# Patient Record
Sex: MALE | Employment: STUDENT | URBAN - METROPOLITAN AREA
[De-identification: names, ages, dates, MRNs, and addresses within clinical notes are randomized per-mention and may not be internally consistent; named-entity substitution may affect disease eponyms.]

---

## 2018-10-05 ENCOUNTER — OFFICE VISIT (OUTPATIENT)
Dept: URGENT CARE | Facility: URGENT CARE | Age: 18
End: 2018-10-05
Payer: COMMERCIAL

## 2018-10-05 VITALS
HEART RATE: 69 BPM | WEIGHT: 185.6 LBS | SYSTOLIC BLOOD PRESSURE: 118 MMHG | DIASTOLIC BLOOD PRESSURE: 80 MMHG | OXYGEN SATURATION: 98 % | RESPIRATION RATE: 14 BRPM | TEMPERATURE: 98.6 F

## 2018-10-05 DIAGNOSIS — R11.0 NAUSEA: ICD-10-CM

## 2018-10-05 DIAGNOSIS — J02.9 VIRAL PHARYNGITIS: Primary | ICD-10-CM

## 2018-10-05 DIAGNOSIS — R07.0 THROAT PAIN: ICD-10-CM

## 2018-10-05 LAB
DEPRECATED S PYO AG THROAT QL EIA: NORMAL
SPECIMEN SOURCE: NORMAL

## 2018-10-05 PROCEDURE — 87081 CULTURE SCREEN ONLY: CPT | Performed by: FAMILY MEDICINE

## 2018-10-05 PROCEDURE — 99214 OFFICE O/P EST MOD 30 MIN: CPT | Performed by: FAMILY MEDICINE

## 2018-10-05 PROCEDURE — 87880 STREP A ASSAY W/OPTIC: CPT | Performed by: FAMILY MEDICINE

## 2018-10-05 RX ORDER — ONDANSETRON 4 MG/1
4 TABLET, FILM COATED ORAL EVERY 8 HOURS PRN
Qty: 18 TABLET | Refills: 0 | Status: SHIPPED | OUTPATIENT
Start: 2018-10-05 | End: 2018-11-12

## 2018-10-05 ASSESSMENT — ENCOUNTER SYMPTOMS
NAUSEA: 1
MYALGIAS: 1
DIAPHORESIS: 1
SORE THROAT: 1
COUGH: 1
FEVER: 1
RHINORRHEA: 1
DIARRHEA: 0
VOMITING: 0
SHORTNESS OF BREATH: 0
CHILLS: 1
TROUBLE SWALLOWING: 1

## 2018-10-05 NOTE — MR AVS SNAPSHOT
"              After Visit Summary   10/5/2018    Manuel Barnes    MRN: [id number]           Patient Information     Date Of Birth          2000        Visit Information        Provider Department      10/5/2018 12:05 PM Nabil Morrell MD [facility]        Today's Diagnoses     Throat pain    -  1       Follow-ups after your visit        Who to contact     If you have questions or need follow up information about today's clinic visit or your schedule please contact [facility] directly at [phone].  Normal or non-critical lab and imaging results will be communicated to you by Kongregatehart, letter or phone within 4 business days after the clinic has received the results. If you do not hear from us within 7 days, please contact the clinic through Kongregatehart or phone. If you have a critical or abnormal lab result, we will notify you by phone as soon as possible.  Submit refill requests through Virtualtwo or call your pharmacy and they will forward the refill request to us. Please allow 3 business days for your refill to be completed.          Additional Information About Your Visit        MyChart Information     Virtualtwo lets you send messages to your doctor, view your test results, renew your prescriptions, schedule appointments and more. To sign up, go to [email or web address] . Click on \"Log in\" on the left side of the screen, which will take you to the Welcome page. Then click on \"Sign up Now\" on the right side of the page.     You will be asked to enter the access code listed below, as well as some personal information. Please follow the directions to create your username and password.     Your access code is: [email or web address]  Expires: 1/3/2019 12:51 PM     Your access code will  in 90 days. If you need help or a new code, please call your [place] clinic or [phone].        Care EveryWhere ID     This is your Care EveryWhere ID. This could be used by " other organizations to access your [place] medical records  [id number]        Your Vitals Were     Pulse Temperature Respirations Pulse Oximetry          69 98.6  F (37  C) (Oral) 14 98%         Blood Pressure from Last 3 Encounters:   10/05/18 118/80   02/15/13 115/68    Weight from Last 3 Encounters:   10/05/18 185 lb 9.6 oz (84.2 kg) (85 %)*   02/15/13 128 lb 4.9 oz (58.2 kg) (71 %)*     * Growth percentiles are based on [facility] 2-20 Years data.              We Performed the Following     Beta strep group A culture     Strep, Rapid Screen          Today's Medication Changes          These changes are accurate as of 10/5/18 12:51 PM.  If you have any questions, ask your nurse or doctor.               Start taking these medicines.        Dose/Directions    nabumetone 750 MG tablet   Commonly known as:  RELAFEN   Used for:  Throat pain   Started by:  Nabil Morrell MD        Dose:  750 mg   Take 1 tablet (750 mg) by mouth 2 times daily as needed for moderate pain   Quantity:  30 tablet   Refills:  1       ondansetron 4 MG tablet   Commonly known as:  ZOFRAN   Used for:  Throat pain   Started by:  Nabil Morrell MD        Dose:  4 mg   Take 1 tablet (4 mg) by mouth every 8 hours as needed for nausea   Quantity:  18 tablet   Refills:  0            Where to get your medicines      These medications were sent to [facility] [address] [place] [address] [address] [facility] [address], [facility] [address]     Phone:  [phone]     nabumetone 750 MG tablet    ondansetron 4 MG tablet                Primary Care Provider Fax #    Physician No Ref-Primary [phone]       No address on file        Equal Access to Services     WIL CESPEDES AH: Shala Ma, waaxda luqadaha, qaybta kaalmada adeegyada, nanette falcon. So [facility] [phone].    ATENCIÓN: Si habla español, tiene a mora disposición servicios gratuitos de asistencia lingüística.  Tyree brar [phone].    We comply with applicable federal civil rights laws and [place] laws. We do not discriminate on the basis of race, color, national origin, age, disability, sex, sexual orientation, or gender identity.            Thank you!     Thank you for choosing [facility]  for your care. Our goal is always to provide you with excellent care. Hearing back from our patients is one way we can continue to improve our services. Please take a few minutes to complete the written survey that you may receive in the mail after your visit with us. Thank you!             Your Updated Medication List - Protect others around you: Learn how to safely use, store and throw away your medicines at www.disposemymeds.org.          This list is accurate as of 10/5/18 12:51 PM.  Always use your most recent med list.                   Brand Name Dispense Instructions for use Diagnosis    nabumetone 750 MG tablet    RELAFEN    30 tablet    Take 1 tablet (750 mg) by mouth 2 times daily as needed for moderate pain    Throat pain       NO ACTIVE MEDICATIONS           ondansetron 4 MG tablet    ZOFRAN    18 tablet    Take 1 tablet (4 mg) by mouth every 8 hours as needed for nausea    Throat pain       TYLENOL PO

## 2018-10-05 NOTE — PROGRESS NOTES
SUBJECTIVE:   Manuel Barnes is a 17 year old male presenting with a chief complaint of   Chief Complaint   Patient presents with     Throat Pain     pt states throat pain,body aches, fever x 5 days        He is an established patient of [facility].    The patient reports onset of subjective fever, chills, diaphoresis, and diffuse myalgias beginning about four days ago. Then, about 2-3 days ago, he developed a sore throat, along with mild congestion, rhinorrhea, dry cough, and mild bilateral ear pain. He also notes some nausea and difficulty swallowing secondary to pain. He denies any strep exposure, but notes that he is in college and around sick people all of the time. He has been taking ibuprofen for his symptoms without much relief. He is concerned for strep.    Review of Systems   Constitutional: Positive for chills, diaphoresis and fever.   HENT: Positive for congestion, ear pain, rhinorrhea, sore throat and trouble swallowing.    Respiratory: Positive for cough. Negative for shortness of breath.    Gastrointestinal: Positive for nausea. Negative for diarrhea and vomiting.   Musculoskeletal: Positive for myalgias.       Past Medical History:   Diagnosis Date     Food allergy     fish     H/O forearm fracture      Family History   Problem Relation Age of Onset     Other - See Comments Mother      churg-niurka     Other - See Comments Mother      calcium abnormalities, related to churg-niurka or treatment, unclear     Myocardial Infarction Maternal Uncle      Current Outpatient Prescriptions   Medication Sig Dispense Refill     Acetaminophen (TYLENOL PO)        nabumetone (RELAFEN) 750 MG tablet Take 1 tablet (750 mg) by mouth 2 times daily as needed for moderate pain 30 tablet 1     ondansetron (ZOFRAN) 4 MG tablet Take 1 tablet (4 mg) by mouth every 8 hours as needed for nausea 18 tablet 0     NO ACTIVE MEDICATIONS        Social History   Substance Use Topics     Smoking status: Never Smoker     Smokeless  tobacco: Never Used     Alcohol use Not on file       OBJECTIVE  /80  Pulse 69  Temp 98.6  F (37  C) (Oral)  Resp 14  Wt 185 lb 9.6 oz (84.2 kg)  SpO2 98%    Physical Exam   Constitutional: He appears well-developed and well-nourished.   HENT:   Head: Normocephalic.   Right Ear: Tympanic membrane, external ear and ear canal normal.   Left Ear: Tympanic membrane, external ear and ear canal normal.   Nose: Nose normal.   Mouth/Throat: Uvula is midline and mucous membranes are normal. No uvula swelling. Posterior oropharyngeal erythema present. No oropharyngeal exudate or posterior oropharyngeal edema. Tonsils are 0 on the right. Tonsils are 0 on the left. No tonsillar exudate.   Eyes: Conjunctivae are normal.   Neck: Normal range of motion. Neck supple.   Cardiovascular: Normal rate, regular rhythm, S1 normal and S2 normal.  Exam reveals no gallop and no friction rub.    No murmur heard.  Pulmonary/Chest: Effort normal and breath sounds normal. He has no wheezes. He has no rhonchi. He has no rales.   Lymphadenopathy:     He has cervical adenopathy.        Right cervical: Superficial cervical adenopathy present.        Left cervical: Superficial cervical adenopathy present.   Neurological: He is alert.   Skin: Skin is warm and dry. No rash noted.       Labs:  Results for orders placed or performed in visit on 10/05/18 (from the past 24 hour(s))   Strep, Rapid Screen   Result Value Ref Range    Specimen Description Throat     Rapid Strep A Screen       NEGATIVE: No Group A streptococcal antigen detected by immunoassay, await culture report.   Culture pending    ASSESSMENT:      ICD-10-CM    1. Viral pharyngitis J02.9    2. Throat pain R07.0 Strep, Rapid Screen     Beta strep group A culture     ondansetron (ZOFRAN) 4 MG tablet     nabumetone (RELAFEN) 750 MG tablet   3. Nausea R11.0     4. Myalgia    Suspect that this is more viral at this time. However did educate on secondary infection.  Will RTC in 48-72  hours if not improved.       PLAN:  1) Discussed likely viral nature of symptoms at this point in time in setting of negative strep. Culture is still pending.  2) Advised symptomatic management, including rest, pushing fluids, throat lozenges.  3) Provided Rx for Relafen for pain management and inflammation, as well as an Rx for Zofran for nausea.  4) Advised follow up with ongoing symptoms or sooner with worsening.    INÉS Starks  10/5/2018 at 1:03 PM    Patient seen and examined.  Note was reviewed and I agree with the assessment    Nabil Morrell MD

## 2018-10-06 LAB
BACTERIA SPEC CULT: NORMAL
SPECIMEN SOURCE: NORMAL

## 2018-11-12 ENCOUNTER — OFFICE VISIT (OUTPATIENT)
Dept: FAMILY MEDICINE | Facility: CLINIC | Age: 18
End: 2018-11-12
Payer: COMMERCIAL

## 2018-11-12 VITALS
TEMPERATURE: 99.6 F | HEART RATE: 82 BPM | DIASTOLIC BLOOD PRESSURE: 70 MMHG | SYSTOLIC BLOOD PRESSURE: 130 MMHG | RESPIRATION RATE: 16 BRPM | WEIGHT: 183 LBS | BODY MASS INDEX: 22.75 KG/M2 | OXYGEN SATURATION: 98 % | HEIGHT: 75 IN

## 2018-11-12 DIAGNOSIS — J02.0 STREP THROAT: ICD-10-CM

## 2018-11-12 DIAGNOSIS — R07.0 THROAT PAIN: Primary | ICD-10-CM

## 2018-11-12 LAB
DEPRECATED S PYO AG THROAT QL EIA: ABNORMAL
SPECIMEN SOURCE: ABNORMAL

## 2018-11-12 PROCEDURE — 87880 STREP A ASSAY W/OPTIC: CPT | Performed by: NURSE PRACTITIONER

## 2018-11-12 PROCEDURE — 99213 OFFICE O/P EST LOW 20 MIN: CPT | Performed by: NURSE PRACTITIONER

## 2018-11-12 NOTE — MR AVS SNAPSHOT
After Visit Summary   11/12/2018    Manuel Barnes    MRN: [id number]           Patient Information     Date Of Birth          2000        Visit Information        Provider Department      11/12/2018 3:40 PM Meme Del Rosario APRN [facility]        Today's Diagnoses     Throat pain    -  1    Strep throat          Care Instructions                Pharyngitis Strep (Strep Throat) Adult   Information About Your Condition:  Description  Sore throat is a common symptom that ranges in severity from just a sense of scratchiness to severe pain. Pharyngitis is the medical term for sore throat.   Strep throat is an inflamed (red and swollen) throat caused by infection with a kind of bacteria called group A Streptococci. With treatment, the fever and sore throat pain are usually gone within 24 hours. After taking antibiotics for 24 hours you are no longer contagious. It is important to treat strep throat to prevent some rare but serious complications such as rheumatic fever (a disease that affects the heart) or glomerulonephritis (a disease that affects the kidneys).   Symptoms   The symptoms of a strep infection may include one or more of the following:   sore, red throat   painful swallowing   fever   chills   headaches   muscle aches and pains   tired feeling   swollen, tender lymph nodes (glands) in the neck   loss of appetite   Causes  Strep throat is caused by infection with bacteria called Group A Streptococci. Strep infections are very contagious. They are usually passed directly from person to person. Strep throat is most common in school-age children, who then often pass it to the rest of the family. It most often occurs from November through April, but it can happen any time of year.   What You Should Do At Home (Follow-up Care)   It is important to get plenty of rest when you are not feeling well so that your body may focus its energy on getting you healthy.   If you  smoke, stop. If someone else in your household smokes ask them to smoke outside.   If you were given a prescription for antibiotics, be sure to get it filled right away. Follow the directions exactly. Take the medicine until it is completely gone. Do not stop taking it just because you feel better.   Acetaminophen (Tylenol ) or over-the-counter anti-inflammatory medicine such as ibuprofen (Motrin , Advil ) or naproxen (Aleve , Naprosyn ) may help decrease fever and pain. You should not take ibuprofen or naproxen if you have a history of bleeding in your stomach.   As long as your healthcare provider has not told you differently, drink plenty of liquids. An average adult should drink at least 6 to 10 eight-ounce glasses of liquids that do not contain alcohol (including beer or wine), such as water, juice, or weak tea each day. One way to tell if you are drinking enough liquid is to look at the color of your urine (pee). It should be very light yellow.   If eating hurts your throat, don't force yourself to eat solid food. When you are able to eat more foods, choose healthy food to give you strength and to help fight the infection.   If the air in your home is dry, a cool-mist humidifier can moisten the air and help make breathing easier. Be sure to clean your humidifier often so that bacteria and mold can t grow. You can also try running hot water in the shower or bathtub to steam up the bathroom. Sit in there for 10 to 15 minutes if you are coughing hard or having trouble breathing.   Gargle with salt water. Stir 1/2 teaspoon salt in an 8-ounce glass of warm water to make your own salt water gargle.   Suck on lozenges or hard candy.   Don't talk a lot. Rest your voice.   Avoid close contact with other people until you have been taking the antibiotic for 24 to 48 hours so they will not be exposed to the strep bacteria.   Cover your nose and mouth with a tissue when coughing or sneezing and then throw it away in the  nearest waste receptacle.   Wash your hands often with warm water and soap for at least 15 seconds before you touch food, dishes, glasses, silverware, or cloth napkins. You can also carry an alcohol-based hand  with you to clean your hands when soap and water aren t available.   Wash your hands after you cough.   Please keep all medicines out of the reach of children.   What You Can Do To Stay Healthy  Be careful not to let your nose or mouth touch public telephones or drinking fountains.   Use paper cups and paper towels in bathrooms instead of shared drinking cups and hand towels.   Do not share food and eating utensils with others.   Stay away from people known to be sick.   Care Alerts  Call 911 if:  You have trouble breathing or swallowing.   Your feel like your throat or tongue are swelling.   Call Your Healthcare Provider Right Away Or Return To The Emergency Department If:  You are coughing up yellow or greenish phlegm (mucus.)   You have a severe headache that does not get better with acetaminophen or ibuprofen.   You start to have a very stiff neck and have pain when you bend your head forward.   You have a fever higher than 101.5  F (38.6  C) orally that does not go down after taking acetaminophen or ibuprofen.   You have any symptoms that worry you            Follow-ups after your visit        Follow-up notes from your care team     Return in about 7 days (around 11/19/2018), or if symptoms worsen or fail to improve.      Who to contact     If you have questions or need follow up information about today's clinic visit or your schedule please contact [facility] directly at [phone].  Normal or non-critical lab and imaging results will be communicated to you by MyChart, letter or phone within 4 business days after the clinic has received the results. If you do not hear from us within 7 days, please contact the clinic through MyChart or phone. If you have a critical or  "abnormal lab result, we will notify you by phone as soon as possible.  Submit refill requests through docTrackr or call your pharmacy and they will forward the refill request to us. Please allow 3 business days for your refill to be completed.          Additional Information About Your Visit        Care EveryWhere ID     This is your Care EveryWhere ID. This could be used by other organizations to access your [place] medical records  [id number]        Your Vitals Were     Pulse Temperature Respirations Height Pulse Oximetry BMI (Body Mass Index)    82 99.6  F (37.6  C) (Tympanic) 16 6' 3\" (1.905 m) 98% 22.87 kg/m2       Blood Pressure from Last 3 Encounters:   11/12/18 130/70   10/05/18 118/80   02/15/13 115/68    Weight from Last 3 Encounters:   11/12/18 183 lb (83 kg) (83 %)*   10/05/18 185 lb 9.6 oz (84.2 kg) (85 %)*   02/15/13 128 lb 4.9 oz (58.2 kg) (71 %)*     * Growth percentiles are based on [facility] 2-20 Years data.              We Performed the Following     Strep, Rapid Screen          Today's Medication Changes          These changes are accurate as of 11/12/18  4:46 PM.  If you have any questions, ask your nurse or doctor.               Start taking these medicines.        Dose/Directions    penicillin G benzathine 281821 UNIT/ML injection   Commonly known as:  BICILLIN   Used for:  Strep throat   Started by:  Meme Del Rosario APRN CNP        Dose:  5260304 Units   Inject 2 mLs (1,200,000 Units) into the muscle once for 1 dose   Quantity:  2 mL   Refills:  0            Where to get your medicines      Some of these will need a paper prescription and others can be bought over the counter.  Ask your nurse if you have questions.     You don't need a prescription for these medications     penicillin G benzathine [id number] UNIT/ML injection                Primary Care Provider Fax #    Physician No Ref-Primary [phone]       No address on file        Equal Access to Services     WIL CESPEDES AH: Hadii jade león " eyad Ma, shabana trejooleha, donnata kadaphne marniesherry, nanette bismarkin hayaayosef dyeromero adriannaandrés lajuan luisyosef ezekiel. So [facility] [phone].    ATENCIÓN: Si habla español, tiene a mora disposición servicios gratuitos de asistencia lingüística. Tyree al [phone].    We comply with applicable federal civil rights laws and [place] laws. We do not discriminate on the basis of race, color, national origin, age, disability, sex, sexual orientation, or gender identity.            Thank you!     Thank you for choosing [facility]  for your care. Our goal is always to provide you with excellent care. Hearing back from our patients is one way we can continue to improve our services. Please take a few minutes to complete the written survey that you may receive in the mail after your visit with us. Thank you!             Your Updated Medication List - Protect others around you: Learn how to safely use, store and throw away your medicines at www.disposemymeds.org.          This list is accurate as of 11/12/18  4:46 PM.  Always use your most recent med list.                   Brand Name Dispense Instructions for use Diagnosis    penicillin G benzathine [id number] UNIT/ML injection    BICILLIN    2 mL    Inject 2 mLs (1,200,000 Units) into the muscle once for 1 dose    Strep throat

## 2018-11-12 NOTE — NURSING NOTE
he following medication was given:     MEDICATION: Bicillin LA 1.2  ROUTE: IM  SITE: Deltoid - Left  DOSE: 1,200,000  LOT #: [id number]  :  [employer]  EXPIRATION DATE:  02/20  NDC#: 74321-471-58  Marce Pascal MA

## 2018-11-12 NOTE — PROGRESS NOTES
"  SUBJECTIVE:   Manuel Barnes is a 17 year old male who presents to clinic today for the following health issues:    Throat Pain     Onset: past 48 hours    Fever: YES    Chills/Sweats: no     Headache (location?): YES    Sinus Pressure:no    Conjunctivitis:  no    Ear Pain: YES: left    Rhinorrhea: no     Congestion: no     Sore Throat: YES     Cough: YES    Wheeze: no     Decreased Appetite: YES    Nausea: no     Vomiting: no     Diarrhea:  no     Dysuria/Freq.: no     Fatigue/Achiness: YES    Sick/Strep Exposure: no      Therapies Tried and outcome: none    Problem list and histories reviewed & adjusted, as indicated.  Additional history: as documented    Reviewed and updated as needed this visit by clinical staff  Tobacco  Allergies  Meds  Problems  Med Hx  Surg Hx  Fam Hx  Soc Hx        Reviewed and updated as needed this visit by Provider  Tobacco  Allergies  Meds  Problems  Med Hx  Surg Hx  Fam Hx  Soc Hx          ROS:  Constitutional, HEENT, cardiovascular, pulmonary, gi and gu systems are negative, except as otherwise noted.    OBJECTIVE:     /70 (BP Location: Right arm, Patient Position: Sitting, Cuff Size: Adult Regular)  Pulse 82  Temp 99.6  F (37.6  C) (Tympanic)  Resp 16  Ht 6' 3\" (1.905 m)  Wt 183 lb (83 kg)  SpO2 98%  BMI 22.87 kg/m2  Body mass index is 22.87 kg/(m^2).  GENERAL: healthy, alert and no distress  EYES: Eyes grossly normal to inspection, PERRL and conjunctivae and sclerae normal  HENT: ear canals and TM's normal, nose  without ulcers or lesions.  Posterior pharynx erythematous and swollen tonsils  NECK: no adenopathy, no asymmetry, masses, or scars and thyroid normal to palpation  RESP: lungs clear to auscultation - no rales, rhonchi or wheezes  CV: regular rate and rhythm, normal S1 S2, no S3 or S4, no murmur, click or rub, no peripheral edema and peripheral pulses strong  MS: no gross musculoskeletal defects noted, no edema  SKIN: no suspicious lesions or " frances    Diagnostic Test Results:  Results for orders placed or performed in visit on 11/12/18 (from the past 24 hour(s))   Strep, Rapid Screen   Result Value Ref Range    Specimen Description Throat     Rapid Strep A Screen (A)      POSITIVE: Group A Streptococcal antigen detected by immunoassay.       ASSESSMENT/PLAN:       ICD-10-CM    1. Throat pain R07.0 Strep, Rapid Screen   2. Strep throat J02.0 penicillin G benzathine (BICILLIN) [id number] UNIT/ML injection     push fluids, rest as able.  Elevate HOB, lots of handwashing.  Toss your toothbrush after 24 hours on the antibiotics.  F/u if persists or worsens  See Patient Instructions    LAURO Main CNP  [facility]

## 2018-11-12 NOTE — PATIENT INSTRUCTIONS
Pharyngitis Strep (Strep Throat) Adult   Information About Your Condition:  Description  Sore throat is a common symptom that ranges in severity from just a sense of scratchiness to severe pain. Pharyngitis is the medical term for sore throat.   Strep throat is an inflamed (red and swollen) throat caused by infection with a kind of bacteria called group A Streptococci. With treatment, the fever and sore throat pain are usually gone within 24 hours. After taking antibiotics for 24 hours you are no longer contagious. It is important to treat strep throat to prevent some rare but serious complications such as rheumatic fever (a disease that affects the heart) or glomerulonephritis (a disease that affects the kidneys).   Symptoms   The symptoms of a strep infection may include one or more of the following:   sore, red throat   painful swallowing   fever   chills   headaches   muscle aches and pains   tired feeling   swollen, tender lymph nodes (glands) in the neck   loss of appetite   Causes  Strep throat is caused by infection with bacteria called Group A Streptococci. Strep infections are very contagious. They are usually passed directly from person to person. Strep throat is most common in school-age children, who then often pass it to the rest of the family. It most often occurs from November through April, but it can happen any time of year.   What You Should Do At Home (Follow-up Care)   It is important to get plenty of rest when you are not feeling well so that your body may focus its energy on getting you healthy.   If you smoke, stop. If someone else in your household smokes ask them to smoke outside.   If you were given a prescription for antibiotics, be sure to get it filled right away. Follow the directions exactly. Take the medicine until it is completely gone. Do not stop taking it just because you feel better.   Acetaminophen (Tylenol ) or over-the-counter anti-inflammatory medicine such as  ibuprofen (Motrin , Advil ) or naproxen (Aleve , Naprosyn ) may help decrease fever and pain. You should not take ibuprofen or naproxen if you have a history of bleeding in your stomach.   As long as your healthcare provider has not told you differently, drink plenty of liquids. An average adult should drink at least 6 to 10 eight-ounce glasses of liquids that do not contain alcohol (including beer or wine), such as water, juice, or weak tea each day. One way to tell if you are drinking enough liquid is to look at the color of your urine (pee). It should be very light yellow.   If eating hurts your throat, don't force yourself to eat solid food. When you are able to eat more foods, choose healthy food to give you strength and to help fight the infection.   If the air in your home is dry, a cool-mist humidifier can moisten the air and help make breathing easier. Be sure to clean your humidifier often so that bacteria and mold can t grow. You can also try running hot water in the shower or bathtub to steam up the bathroom. Sit in there for 10 to 15 minutes if you are coughing hard or having trouble breathing.   Gargle with salt water. Stir 1/2 teaspoon salt in an 8-ounce glass of warm water to make your own salt water gargle.   Suck on lozenges or hard candy.   Don't talk a lot. Rest your voice.   Avoid close contact with other people until you have been taking the antibiotic for 24 to 48 hours so they will not be exposed to the strep bacteria.   Cover your nose and mouth with a tissue when coughing or sneezing and then throw it away in the nearest waste receptacle.   Wash your hands often with warm water and soap for at least 15 seconds before you touch food, dishes, glasses, silverware, or cloth napkins. You can also carry an alcohol-based hand  with you to clean your hands when soap and water aren t available.   Wash your hands after you cough.   Please keep all medicines out of the reach of children.   What  You Can Do To Stay Healthy  Be careful not to let your nose or mouth touch public telephones or drinking fountains.   Use paper cups and paper towels in bathrooms instead of shared drinking cups and hand towels.   Do not share food and eating utensils with others.   Stay away from people known to be sick.   Care Alerts  Call 911 if:  You have trouble breathing or swallowing.   Your feel like your throat or tongue are swelling.   Call Your Healthcare Provider Right Away Or Return To The Emergency Department If:  You are coughing up yellow or greenish phlegm (mucus.)   You have a severe headache that does not get better with acetaminophen or ibuprofen.   You start to have a very stiff neck and have pain when you bend your head forward.   You have a fever higher than 101.5  F (38.6  C) orally that does not go down after taking acetaminophen or ibuprofen.   You have any symptoms that worry you

## 2021-03-28 ENCOUNTER — HOSPITAL ENCOUNTER (EMERGENCY)
Facility: CLINIC | Age: 21
Discharge: HOME | End: 2021-03-28
Attending: NURSE PRACTITIONER | Admitting: NURSE PRACTITIONER

## 2021-03-28 VITALS
TEMPERATURE: 98 F | HEART RATE: 72 BPM | OXYGEN SATURATION: 99 % | SYSTOLIC BLOOD PRESSURE: 135 MMHG | DIASTOLIC BLOOD PRESSURE: 74 MMHG | RESPIRATION RATE: 14 BRPM

## 2021-03-28 DIAGNOSIS — T40.8X1A: ICD-10-CM

## 2021-03-28 LAB
AMPHETAMINES UR QL: NOT DETECTED NG/ML
ANION GAP SERPL CALCULATED.3IONS-SCNC: 7 MMOL/L (ref 3–14)
BARBITURATES UR QL SCN: NOT DETECTED NG/ML
BASOPHILS # BLD AUTO: 0 10E9/L (ref 0–0.2)
BASOPHILS NFR BLD AUTO: 0.2 %
BENZODIAZ UR QL SCN: NOT DETECTED NG/ML
BUN SERPL-MCNC: 13 MG/DL (ref 7–30)
BUPRENORPHINE UR QL: NOT DETECTED NG/ML
CALCIUM SERPL-MCNC: 9.5 MG/DL (ref 8.5–10.1)
CANNABINOIDS UR QL: NOT DETECTED NG/ML
CHLORIDE SERPL-SCNC: 108 MMOL/L (ref 94–109)
CO2 SERPL-SCNC: 26 MMOL/L (ref 20–32)
COCAINE UR QL SCN: NOT DETECTED NG/ML
CREAT SERPL-MCNC: 0.89 MG/DL (ref 0.66–1.25)
D-METHAMPHET UR QL: NOT DETECTED NG/ML
DIFFERENTIAL METHOD BLD: ABNORMAL
EOSINOPHIL # BLD AUTO: 0.9 10E9/L (ref 0–0.7)
EOSINOPHIL NFR BLD AUTO: 7.7 %
ERYTHROCYTE [DISTWIDTH] IN BLOOD BY AUTOMATED COUNT: 12.1 % (ref 10–15)
GFR SERPL CREATININE-BSD FRML MDRD: >90 ML/MIN/{1.73_M2}
GLUCOSE SERPL-MCNC: 105 MG/DL (ref 70–99)
HCT VFR BLD AUTO: 43.9 % (ref 40–53)
HGB BLD-MCNC: 15.1 G/DL (ref 13.3–17.7)
IMM GRANULOCYTES # BLD: 0 10E9/L (ref 0–0.4)
IMM GRANULOCYTES NFR BLD: 0.3 %
LYMPHOCYTES # BLD AUTO: 1.6 10E9/L (ref 0.8–5.3)
LYMPHOCYTES NFR BLD AUTO: 13.4 %
MCH RBC QN AUTO: 32 PG (ref 26.5–33)
MCHC RBC AUTO-ENTMCNC: 34.4 G/DL (ref 31.5–36.5)
MCV RBC AUTO: 93 FL (ref 78–100)
METHADONE UR QL SCN: NOT DETECTED NG/ML
MONOCYTES # BLD AUTO: 0.7 10E9/L (ref 0–1.3)
MONOCYTES NFR BLD AUTO: 5.7 %
NEUTROPHILS # BLD AUTO: 8.8 10E9/L (ref 1.6–8.3)
NEUTROPHILS NFR BLD AUTO: 72.7 %
NRBC # BLD AUTO: 0 10*3/UL
NRBC BLD AUTO-RTO: 0 /100
OPIATES UR QL SCN: NOT DETECTED NG/ML
OXYCODONE UR QL SCN: NOT DETECTED NG/ML
PCP UR QL SCN: NOT DETECTED NG/ML
PLATELET # BLD AUTO: 228 10E9/L (ref 150–450)
POTASSIUM SERPL-SCNC: 3.7 MMOL/L (ref 3.4–5.3)
PROPOXYPH UR QL: NOT DETECTED NG/ML
RBC # BLD AUTO: 4.72 10E12/L (ref 4.4–5.9)
SODIUM SERPL-SCNC: 141 MMOL/L (ref 133–144)
TRICYCLICS UR QL SCN: NOT DETECTED NG/ML
WBC # BLD AUTO: 12.1 10E9/L (ref 4–11)

## 2021-03-28 PROCEDURE — 85025 COMPLETE CBC W/AUTO DIFF WBC: CPT | Performed by: NURSE PRACTITIONER

## 2021-03-28 PROCEDURE — 96374 THER/PROPH/DIAG INJ IV PUSH: CPT | Performed by: NURSE PRACTITIONER

## 2021-03-28 PROCEDURE — 99285 EMERGENCY DEPT VISIT HI MDM: CPT | Mod: 25 | Performed by: NURSE PRACTITIONER

## 2021-03-28 PROCEDURE — 250N000011 HC RX IP 250 OP 636: Performed by: NURSE PRACTITIONER

## 2021-03-28 PROCEDURE — 250N000013 HC RX MED GY IP 250 OP 250 PS 637: Performed by: NURSE PRACTITIONER

## 2021-03-28 PROCEDURE — 93005 ELECTROCARDIOGRAM TRACING: CPT | Performed by: NURSE PRACTITIONER

## 2021-03-28 PROCEDURE — 80048 BASIC METABOLIC PNL TOTAL CA: CPT | Performed by: NURSE PRACTITIONER

## 2021-03-28 PROCEDURE — 99284 EMERGENCY DEPT VISIT MOD MDM: CPT | Mod: 25 | Performed by: NURSE PRACTITIONER

## 2021-03-28 PROCEDURE — 93010 ELECTROCARDIOGRAM REPORT: CPT | Performed by: NURSE PRACTITIONER

## 2021-03-28 PROCEDURE — 80306 DRUG TEST PRSMV INSTRMNT: CPT | Performed by: NURSE PRACTITIONER

## 2021-03-28 RX ORDER — LORAZEPAM 2 MG/ML
1 INJECTION INTRAMUSCULAR ONCE
Status: COMPLETED | OUTPATIENT
Start: 2021-03-28 | End: 2021-03-28

## 2021-03-28 RX ORDER — LORAZEPAM 0.5 MG/1
0.5 TABLET ORAL
Status: COMPLETED | OUTPATIENT
Start: 2021-03-28 | End: 2021-03-28

## 2021-03-28 RX ADMIN — LORAZEPAM 0.5 MG: 0.5 TABLET ORAL at 16:42

## 2021-03-28 RX ADMIN — LORAZEPAM 1 MG: 2 INJECTION INTRAMUSCULAR; INTRAVENOUS at 15:11

## 2021-03-28 ASSESSMENT — ENCOUNTER SYMPTOMS
GASTROINTESTINAL NEGATIVE: 1
NERVOUS/ANXIOUS: 1
CHILLS: 0
FEVER: 0
CARDIOVASCULAR NEGATIVE: 1
MUSCULOSKELETAL NEGATIVE: 1
RESPIRATORY NEGATIVE: 1

## 2021-03-28 NOTE — ED NOTES
Per Laurent at Poison Control: Recommend supportive care up to 12 hours, EKG, Tele, Benzodiazepines. Expect Hallucinations, Psychosis, Tachycardia, and Hypertension.

## 2021-03-28 NOTE — ED PROVIDER NOTES
"Updated I updated updated he was updated  History     Chief Complaint   Patient presents with     Drug Overdose     HPI  Manuel Barnes is a 20 year old male who presents via EMS for evaluation of LSD overdose. Patient uses LSD on a regular bases. Today he and his girflriend took 500 mcg each at noon, which is a higher amount than he typically takes. Feels like his \"limbs are hypoxic\". Patient called ambulance and both he and his girlfriend presented here. Patient denies any other illicit drug or alcohol use. He is concerned that there is something else that was mixed in with the LSD. He purchased the drugs from \"the same jo\" he always gets it from and believes this person is trustworthy.    Allergies:  Allergies   Allergen Reactions     Cortisone Rash       Problem List:    There are no active problems to display for this patient.       Past Medical History:    Past Medical History:   Diagnosis Date     Food allergy      H/O forearm fracture        Past Surgical History:    Past Surgical History:   Procedure Laterality Date     HERNIA REPAIR         Family History:    Family History   Problem Relation Age of Onset     Other - See Comments Mother         churg-niurka/calcium abnormalities, related to churg-niurka or treatment, unclear     Myocardial Infarction Maternal Uncle        Social History:  Marital Status:  Single [1]  Social History     Tobacco Use     Smoking status: Never Smoker     Smokeless tobacco: Never Used   Substance Use Topics     Alcohol use: No     Drug use: No        Medications:    No current outpatient medications on file.        Review of Systems   Constitutional: Negative for chills and fever.   HENT: Negative.    Respiratory: Negative.    Cardiovascular: Negative.    Gastrointestinal: Negative.    Genitourinary: Negative.    Musculoskeletal: Negative.    Skin: Negative.    Neurological:        Tingling in arms/hands   Psychiatric/Behavioral: The patient is nervous/anxious.    All other " systems reviewed and are negative.      Physical Exam   BP: 132/77  Pulse: 80  Temp: 98  F (36.7  C)  Resp: 16  SpO2: 99 %      Physical Exam    GENERAL APPEARANCE: alert and oriented. NAD.   EYES: conjunctiva clear  RESP: lungs clear to auscultation - no rales, rhonchi or wheezes  CV: regular rates and rhythm, normal S1 S2, no murmur noted  NEURO: Normal strength and tone, sensory exam grossly normal,  normal speech and mentation  SKIN: no suspicious lesions or rashes    ED Course        Procedures            EKG Interpretation:      Interpreted by LAURO Abbott CNP  Time reviewed:1508   Symptoms at time of EKG: anxious   Rhythm: Normal sinus   Rate: Normal  Axis: Normal  Ectopy: None  Conduction: Right bundle branch block (incomplete)  ST Segments/ T Waves: No ST-T wave changes and No acute ischemic changes  Q Waves: None  Comparison to prior: No old EKG available    Clinical Impression: NSR with right BBB      Results for orders placed or performed during the hospital encounter of 03/28/21 (from the past 24 hour(s))   CBC with platelets differential   Result Value Ref Range    WBC 12.1 (H) 4.0 - 11.0 10e9/L    RBC Count 4.72 4.4 - 5.9 10e12/L    Hemoglobin 15.1 13.3 - 17.7 g/dL    Hematocrit 43.9 40.0 - 53.0 %    MCV 93 78 - 100 fl    MCH 32.0 26.5 - 33.0 pg    MCHC 34.4 31.5 - 36.5 g/dL    RDW 12.1 10.0 - 15.0 %    Platelet Count 228 150 - 450 10e9/L    Diff Method Automated Method     % Neutrophils 72.7 %    % Lymphocytes 13.4 %    % Monocytes 5.7 %    % Eosinophils 7.7 %    % Basophils 0.2 %    % Immature Granulocytes 0.3 %    Nucleated RBCs 0 0 /100    Absolute Neutrophil 8.8 (H) 1.6 - 8.3 10e9/L    Absolute Lymphocytes 1.6 0.8 - 5.3 10e9/L    Absolute Monocytes 0.7 0.0 - 1.3 10e9/L    Absolute Eosinophils 0.9 (H) 0.0 - 0.7 10e9/L    Absolute Basophils 0.0 0.0 - 0.2 10e9/L    Abs Immature Granulocytes 0.0 0 - 0.4 10e9/L    Absolute Nucleated RBC 0.0    Basic metabolic panel   Result Value Ref  Range    Sodium 141 133 - 144 mmol/L    Potassium 3.7 3.4 - 5.3 mmol/L    Chloride 108 94 - 109 mmol/L    Carbon Dioxide 26 20 - 32 mmol/L    Anion Gap 7 3 - 14 mmol/L    Glucose 105 (H) 70 - 99 mg/dL    Urea Nitrogen 13 7 - 30 mg/dL    Creatinine 0.89 0.66 - 1.25 mg/dL    GFR Estimate >90 >60 mL/min/[1.73_m2]    GFR Estimate If Black >90 >60 mL/min/[1.73_m2]    Calcium 9.5 8.5 - 10.1 mg/dL   Urine Drugs of Abuse Screen Panel 13   Result Value Ref Range    Cannabinoids (89-yoi-5-carboxy-9-THC) Not Detected NDET^Not Detected ng/mL    Phencyclidine (Phencyclidine) Not Detected NDET^Not Detected ng/mL    Cocaine (Benzoylecgonine) Not Detected NDET^Not Detected ng/mL    Methamphetamine (d-Methamphetamine) Not Detected NDET^Not Detected ng/mL    Opiates (Morphine) Not Detected NDET^Not Detected ng/mL    Amphetamine (d-Amphetamine) Not Detected NDET^Not Detected ng/mL    Benzodiazepines (Nordiazepam) Not Detected NDET^Not Detected ng/mL    Tricyclic Antidepressants (Desipramine) Not Detected NDET^Not Detected ng/mL    Methadone (Methadone) Not Detected NDET^Not Detected ng/mL    Barbiturates (Butalbital) Not Detected NDET^Not Detected ng/mL    Oxycodone (Oxycodone) Not Detected NDET^Not Detected ng/mL    Propoxyphene (Norpropoxyphene) Not Detected NDET^Not Detected ng/mL    Buprenorphine (Buprenorphine) Not Detected NDET^Not Detected ng/mL       Medications   LORazepam (ATIVAN) injection 1 mg (1 mg Intravenous Given 3/28/21 1511)   LORazepam (ATIVAN) tablet 0.5 mg (0.5 mg Oral Given 3/28/21 1642)     1500: RN spoke with poison control-- recommends supportive care, may take up to 6-12 hours to clear, treat negative symptoms/agitation with Benzodiazepines.   Assessments & Plan (with Medical Decision Making)   20 year old male who presents via EMS after he took 500 mcg of LSD at noon today and became anxious and feeling like his arms/hands were tingling.  Patient presented here by ambulance along with his girlfriend who also  came by ambulance with similar negative symptoms after using LSD.  Poison control was consulted and recommended symptomatic/supportive care and benzodiazepines for negative symptoms.  May take up to 6 to 12 hours to completely clear.  Patient was given IV Ativan 1 mg and again was given 0.5 mg tablet.  Has remained calm and does not appear to be anxious or in distress. Exam is unremarkable. He has been cooperative and lucid.  Labs and ekg are unremarkable.  His urine tox screen is negative for any other substances. He was monitored here for 3 hours, and it has been 5 hours since he used LSD.  He was discharged home stable.    I have reviewed the nursing notes.    I have reviewed the findings, diagnosis, plan and need for follow up with the patient.      New Prescriptions    No medications on file       Final diagnoses:   None       3/28/2021   [facility] EMERGENCY DEPT     Sofía Bateman APRN CNP  03/28/21 2050       Sofía Bateman APRN CNP  04/01/21 1023

## 2021-03-28 NOTE — ED TRIAGE NOTES
"Here complaining of bad side affects from LSD. States he took 500 mcg of LSD and would like some xanax to reverse the ill affect. He normally takes about 100 mcg but has taken up to 500 mcg in the past and not felt \"it's hard to describe the way I feel\".   "